# Patient Record
(demographics unavailable — no encounter records)

---

## 2025-07-06 NOTE — ASSESSMENT
[FreeTextEntry1] : MANUEL KHAN is a 5 year boy with history of recurrent cough and wheeze with viral illnesses, eczema, and environmental allergies. Symptoms began around 2-3 years. No hospitalizations. Seen in ED last week for RAD exacerbation, received duonebs and Dex x1. Asthma predictive index: personal history of atopic dermatitis and aeroallergen sensitization increase His risk for asthma.   Discussed with parents that asthma is a clinical diagnosis based on risk factors, symptoms and response to medications. Symptoms of chronic cough and recurrent wheeze with a response to bronchodilators are consistent with mild intermittent asthma. Most of the child's exacerbations are probably triggered by viral illnesses and allergic triggers. No recurrent cough when well. Since ED visit last week, has recovered and has been well without cough over the last few days. Lungs clear on exam. No other OCS bursts in lifetime. No indication for daily ICS at this time. Discussed asthma control vs poorly controlled symptoms. Should he develop recurrent cough more than 2 days per week, more than 2 nights per month, or frequent albuterol use more than 2 times per week, I would recommend maintenance anti-inflammatory therapy with Fluticasone Propionate 44mcg 2 puffs BID with spacer for several months to decrease airway inflammation, airway reactivity and achieve optimal control of His asthma symptoms. Use bronchodilators as needed for exacerbations. Safety and efficacy of anti-inflammatory medications and bronchodilators were reviewed with the parents.   Discussed asthma, seasonality, and exacerbation with specific triggers including cold weather, allergens, exercise, viral illnesses. Educated on proper MDI/spacer technique and importance of medication compliance. Encouraged avoidance of tobacco smoke exposure and educated on its harmful effects in children with asthma. Discussed signs of respiratory distress and when to seek medical attention.   The patient has clinical findings consistent with rhinitis and an intranasal steroid and/or an anti-histamine with Zyrtec/Claritin and Flonase may be helpful in controlling symptoms associated with a post-nasal drip and upper airway cough syndrome. Consider an allergy evaluation to help determine His allergic triggers, phone number provided today. History of allergies +mice and cockroach, mother unsure of other allergic triggers. Testing was done around 1 year. Phone number for allergist provided today. Aggressive control of airway inflammation secondary to allergies would help achieve optimal asthma control.   No confounding issues such as WILL or EDWIN at this time.   No history of recurrent respiratory infections making immune deficiency, cystic fibrosis and primary ciliary dyskinesia less likely at this time.   Discussed recommendations for flu and COVID 19 vaccines. Safety, side effects and efficacy reviewed.   Parents received plans well.   Follow up in 2 months.  Plan: - Albuterol PRN - Allergy referral - Follow up 2 months

## 2025-07-06 NOTE — SOCIAL HISTORY
[Mother] : mother [Father] : father [] :  [None] : none [Smokers in Household] : there are smokers in the home [de-identified] : Aunt [Bedroom] : not in the bedroom [Basement] : not in the basement [Living Area] : not in the living area [de-identified] : dad smokes inside and outside the house

## 2025-07-06 NOTE — REVIEW OF SYSTEMS
[Rhinorrhea] : rhinorrhea [Nasal Congestion] : nasal congestion [Postnasl Drip] : postnasal drip [Wheezing] : wheezing [Cough] : cough [Shortness of Breath] : shortness of breath [Eczema] : eczema [Immunizations are up to date] : Immunizations are up to date [Influenza Vaccine this Past Year] : influenza vaccine this past year [Poor Appetite] : no poor appetite [Frequent URIs] : no frequent upper respiratory infections [Snoring] : no snoring [Apnea] : no apnea [Sinus Problems] : no sinus problems [Recurrent Ear Infections] : no recurrent ear infections [Heart Disease] : no heart disease [Pneumonia] : no pneumonia [Problems Swallowing] : no problems swallowing [Reflux] : no reflux [Failure To Thrive] : no failure to thrive [COVID-19 Immunization] : no COVID-19 immunization

## 2025-07-06 NOTE — PHYSICAL EXAM
[Well Nourished] : well nourished [Well Developed] : well developed [Alert] : ~L alert [Active] : active [Normal Breathing Pattern] : normal breathing pattern [No Respiratory Distress] : no respiratory distress [No Allergic Shiners] : no allergic shiners [No Drainage] : no drainage [No Conjunctivitis] : no conjunctivitis [No Sinus Tenderness] : no sinus tenderness [No Oral Pallor] : no oral pallor [No Oral Cyanosis] : no oral cyanosis [Non-Erythematous] : non-erythematous [No Exudates] : no exudates [No Tonsillar Enlargement] : no tonsillar enlargement [No Stridor] : no stridor [Absence Of Retractions] : absence of retractions [Symmetric] : symmetric [Good Expansion] : good expansion [No Acc Muscle Use] : no accessory muscle use [Good aeration to bases] : good aeration to bases [Equal Breath Sounds] : equal breath sounds bilaterally [No Crackles] : no crackles [No Rhonchi] : no rhonchi [No Wheezing] : no wheezing [Normal Sinus Rhythm] : normal sinus rhythm [No Heart Murmur] : no heart murmur [Soft, Non-Tender] : soft, non-tender [No Hepatosplenomegaly] : no hepatosplenomegaly [Non Distended] : was not ~L distended [Abdomen Mass (___ Cm)] : no abdominal mass palpated [Full ROM] : full range of motion [No Clubbing] : no clubbing [Capillary Refill < 2 secs] : capillary refill less than two seconds [No Cyanosis] : no cyanosis [No Contractures] : no contractures [Abnormal Walk] : normal gait [Alert and  Oriented] : alert and oriented [No Abnormal Focal Findings] : no abnormal focal findings [No Birth Marks] : no birth marks [No Rashes] : no rashes [No Skin Lesions] : no skin lesions [FreeTextEntry3] : normal external exam  [FreeTextEntry4] : +congested nasal mucosa

## 2025-07-06 NOTE — CONSULT LETTER
[Dear  ___] : Dear  [unfilled], [Courtesy Letter:] : I had the pleasure of seeing your patient, [unfilled], in my office today. [Please see my note below.] : Please see my note below. [Consult Closing:] : Thank you very much for allowing me to participate in the care of this patient.  If you have any questions, please do not hesitate to contact me. [Sincerely,] : Sincerely, [FreeTextEntry3] : Myah Zelaya NP

## 2025-07-06 NOTE — HISTORY OF PRESENT ILLNESS
[FreeTextEntry1] : MANUEL is a 5 year old M with history of mild intermittent asthma, allergic rhinitis, eczema.    July 2nd, 2025 follow up visit: Interval Hx: -Last seen April 2025; recs: Albuterol PRN. Allergy referral.  - Doing well - Daily meds: Rescue meds: Albuterol PRN Recent ER visits/hospitalizations: Last oral steroid course: Baseline daytime cough, SOB or wheeze: Baseline nocturnal cough, SOB or wheeze: Exertional cough, SOB or wheeze: Allergic rhinitis symptoms: Snoring:   Flu vaccine [id number]: Misc notes:  ==  INITIAL VISIT: Visit Date: 04/08/2025 - Recurrent cough and wheeze with viral illnesses, symptoms started around 2-3 years  - Mom reports that when he was little, his PCP "did a test for asthma" and said he did not have asthma - Shortness of breath and cough when sick with colds, has albuterol at home which improves shortness of breath and cough  - Sick with cough and wheeze last week, went to ED and was told he has asthma. Gave duonebs and dex x1   Age of Onset of Symptoms: 2-3 years  PMH: recurrent cough and wheeze, environmental allergies PSH: denies  Meds: none Birth Hx: FT, no complications  PCP/Specialists: Dr. Noyola    Cough Hx: Triggers: viral illnesses  Allergies: mice, cockroach, multiple other environmental allergens mother unable to recall. Tested around 1 year, interested in retesting Hx of wheezing: yes  GINA Use: yes  Use of oral steroids: April 2025  ED/Hospitalizations: April 2025  Daytime cough: denies  Nighttime cough: denies  Respiratory symptoms with exercise: denies  Chest x-ray: denies    Family hx: Mom- prediabetes, thyroid problems Dad- healthy Brother- healthy Brother- healthy  Family hx of asthma: denies  Family hx of cystic fibrosis, autoimmune disease, recurrent respiratory infections: denies  Feeding issues, WILL: denies  EDWIN Sx, Snoring/Gasping/Pauses: denies  Hx of Eczema: yes  Hx of rhinitis, post nasal drip: yes  Hx of recurrent infections (ie: pneumonia, AOM, sinusitis): denies  Seen by pulmonologist before: denies    Vaccines UTD: yes  Influenza Vaccine: yes  COVID-19 Vaccine: denies  H/o COVID19/RSV infection: RSV at 4 years   Modified Asthma Predictive Index (mAPI): 4 wheezing illnesses AND 1 major criteria Parental asthma: NO atopic dermatitis: YES Aeroallergen sensitization suspected: YES   OR   2 minor criteria Food sensitization: NO Peripheral blood eosinophilia = % Wheezing apart from colds: NO